# Patient Record
Sex: MALE | Race: WHITE | NOT HISPANIC OR LATINO | Employment: UNEMPLOYED | URBAN - METROPOLITAN AREA
[De-identification: names, ages, dates, MRNs, and addresses within clinical notes are randomized per-mention and may not be internally consistent; named-entity substitution may affect disease eponyms.]

---

## 2024-06-21 ENCOUNTER — HOSPITAL ENCOUNTER (EMERGENCY)
Facility: HOSPITAL | Age: 44
Discharge: HOME | End: 2024-06-21
Attending: EMERGENCY MEDICINE
Payer: MEDICAID

## 2024-06-21 VITALS
SYSTOLIC BLOOD PRESSURE: 119 MMHG | HEART RATE: 75 BPM | HEIGHT: 70 IN | TEMPERATURE: 98 F | OXYGEN SATURATION: 97 % | DIASTOLIC BLOOD PRESSURE: 81 MMHG | BODY MASS INDEX: 23.62 KG/M2 | RESPIRATION RATE: 18 BRPM | WEIGHT: 165 LBS

## 2024-06-21 DIAGNOSIS — I10 UNCONTROLLED HYPERTENSION: ICD-10-CM

## 2024-06-21 DIAGNOSIS — Z76.0 MEDICATION REFILL: Primary | ICD-10-CM

## 2024-06-21 PROCEDURE — 99281 EMR DPT VST MAYX REQ PHY/QHP: CPT

## 2024-06-21 RX ORDER — LISINOPRIL 10 MG/1
10 TABLET ORAL DAILY
Qty: 30 TABLET | Refills: 2 | Status: SHIPPED | OUTPATIENT
Start: 2024-06-21 | End: 2024-09-19

## 2024-06-21 NOTE — FIRST PROVIDER EVALUATION
Emergency Department TeleTriage Encounter Note      CHIEF COMPLAINT    Chief Complaint   Patient presents with    BLOOD PRESSURE PROBLEM     STATES HE KNOWS WHEN HIS BP GOES UP AND OR DOWN. WANTS TO BE CHECKED OUT    Medication Refill     BP MEDS OUT X 2 MOS       VITAL SIGNS   Initial Vitals [06/21/24 1445]   BP Pulse Resp Temp SpO2   (!) 163/104 90 16 98 °F (36.7 °C) 97 %      MAP       --            ALLERGIES    Review of patient's allergies indicates:  No Known Allergies    PROVIDER TRIAGE NOTE  This is a teletriage evaluation of a 43 y.o. male presenting to the ED complaining of blood pressure elevated. Patient has been living at a shelter and has not had his blood pressure medications for about 2 months. He denies chest pain, shortness of breath, or headache at this time.     Patient is alert and oriented. He speaks in complete sentences. He is sitting upright in the chair in no distress.     Initial orders will be placed and care will be transferred to an alternate provider when patient is roomed for a full evaluation. Any additional orders and the final disposition will be determined by that provider.         ORDERS  Labs Reviewed - No data to display    ED Orders (720h ago, onward)      None              Virtual Visit Note: The provider triage portion of this emergency department evaluation and documentation was performed via [employer]dex, a HIPAA-compliant telemedicine application, in concert with a tele-presenter in the room. A face to face patient evaluation with one of my colleagues will occur once the patient is placed in an emergency department room.      DISCLAIMER: This note was prepared with We[employer] voice recognition transcription software. Garbled syntax, mangled pronouns, and other bizarre constructions may be attributed to that software system.

## 2024-06-21 NOTE — ED PROVIDER NOTES
Encounter Date: 6/21/2024       History     Chief Complaint   Patient presents with    BLOOD PRESSURE PROBLEM     STATES HE KNOWS WHEN HIS BP GOES UP AND OR DOWN. WANTS TO BE CHECKED OUT    Medication Refill     BP MEDS OUT X 2 MOS     43-year-old male with past medical history of hypertension presents emergency department for blood pressure medication refill.  Patient says that he is recently moved back to the area and he has in a program and he needs his blood pressure medication.  He does not have a primary care provider.  He says that he has been out of his blood pressure medication for about 2 months.  Currently has no symptoms.  He denies any chest pain shortness of breath or any headache.  He used to take lisinopril 10 mg daily but has not had it in 2 months.  He says when his blood pressure goes high he can feel strange and that he needs to get back on his medicine.  Currently he does not feel strange.      Review of patient's allergies indicates:  No Known Allergies  Past Medical History:   Diagnosis Date    Homeless     Hypertension      No past surgical history on file.  No family history on file.     Review of Systems   Constitutional:  Negative for chills and fever.   HENT:  Negative for sore throat.    Respiratory:  Negative for shortness of breath.    Cardiovascular:  Negative for chest pain.   Gastrointestinal:  Negative for nausea.   Genitourinary:  Negative for dysuria.   Musculoskeletal:  Negative for back pain.   Skin:  Negative for rash.   Neurological:  Negative for weakness.   Hematological:  Does not bruise/bleed easily.   All other systems reviewed and are negative.      Physical Exam     Initial Vitals [06/21/24 1445]   BP Pulse Resp Temp SpO2   (!) 163/104 90 16 98 °F (36.7 °C) 97 %      MAP       --         Physical Exam    Nursing note and vitals reviewed.  Constitutional: He appears well-developed and well-nourished. He is not diaphoretic. No distress.   HENT:   Head: Normocephalic and  atraumatic.   Mouth/Throat: Oropharynx is clear and moist. No oropharyngeal exudate.   Eyes: Conjunctivae and EOM are normal. Pupils are equal, round, and reactive to light.   Neck: Neck supple. No tracheal deviation present.   Normal range of motion.  Cardiovascular:  Normal rate, regular rhythm, normal heart sounds and intact distal pulses.           No murmur heard.  Pulmonary/Chest: Breath sounds normal. No stridor. No respiratory distress. He has no wheezes. He has no rhonchi. He has no rales. He exhibits no tenderness.   Abdominal: Abdomen is soft. Bowel sounds are normal. He exhibits no distension. There is no abdominal tenderness. There is no rebound and no guarding.   Musculoskeletal:         General: No tenderness or edema. Normal range of motion.      Cervical back: Normal range of motion and neck supple.     Neurological: He is alert and oriented to person, place, and time. He has normal strength. No cranial nerve deficit or sensory deficit.   Skin: Skin is warm and dry. Capillary refill takes less than 2 seconds. No rash noted. No erythema. No pallor.   Psychiatric: He has a normal mood and affect. His behavior is normal. Thought content normal.         ED Course   Procedures  Labs Reviewed - No data to display       Imaging Results    None          Medications - No data to display  Medical Decision Making  Differential includes but not limited to dehydration, hypertension uncontrolled, medication refill,    Emergent evaluation 43-year-old male presents emergency department with medication refill for blood pressure.  Patient is asymptomatic.  Patient does have hypertension but has been out of his medications for 2 months.  I will refill his lisinopril.  I will set him up with [facility] for primary care provider.  He will return if his symptoms change or worsen.  I discussed with pt. need for better control of BP and counseled him on prolonged uncontrolled hypertension leading to MI, Stroke,  Dissection, Kidney Failure, etc. and the patient understood. The patient has good follow up with the primary care provider and is agreeable to follow up. There is no hypertensive emergency in the ED. The patient is asymptomatic.      A dictation software program was used for this note.  Please expect some simple typographical  errors in this note.     Risk  OTC drugs.  Prescription drug management.                                      Clinical Impression:  Final diagnoses:  [Z76.0] Medication refill (Primary)  [I10] Uncontrolled hypertension          ED Disposition Condition    Discharge Stable          ED Prescriptions       Medication Sig Dispense Start Date End Date Auth. Provider    lisinopriL 10 MG tablet Take 1 tablet (10 mg total) by mouth once daily. 30 tablet 6/21/2024 9/19/2024 Harvey Martin DO          Follow-up Information       Follow up With Specialties Details Why Contact Info Additional Information    [facility] - Emergency Dept Emergency Medicine  If symptoms worsen [address] [facility] [address]  [phone] 1st floor    [facility]  In 1 week 3 [address] [facility] [address]  [phone]       [facility]    [address] [address]  [phone]                Harvey Martin DO  06/21/24 5062

## 2024-06-25 ENCOUNTER — OFFICE VISIT (OUTPATIENT)
Dept: URGENT CARE | Facility: CLINIC | Age: 44
End: 2024-06-25
Payer: MEDICAID

## 2024-06-25 VITALS
HEIGHT: 70 IN | TEMPERATURE: 98 F | BODY MASS INDEX: 23.62 KG/M2 | RESPIRATION RATE: 16 BRPM | OXYGEN SATURATION: 97 % | WEIGHT: 165 LBS | DIASTOLIC BLOOD PRESSURE: 97 MMHG | SYSTOLIC BLOOD PRESSURE: 131 MMHG | HEART RATE: 109 BPM

## 2024-06-25 DIAGNOSIS — T73.3XXA FATIGUE DUE TO EXCESSIVE EXERTION, INITIAL ENCOUNTER: ICD-10-CM

## 2024-06-25 DIAGNOSIS — L97.521 ULCER OF LEFT FOOT, LIMITED TO BREAKDOWN OF SKIN: Primary | ICD-10-CM

## 2024-06-25 LAB — GLUCOSE SERPL-MCNC: 99 MG/DL (ref 70–110)

## 2024-06-25 PROCEDURE — 99204 OFFICE O/P NEW MOD 45 MIN: CPT | Mod: S$GLB,,,

## 2024-06-25 PROCEDURE — 82962 GLUCOSE BLOOD TEST: CPT | Mod: ,,,

## 2024-06-25 NOTE — PATIENT INSTRUCTIONS
Sure you keep your feet clean and dry.  Limited to a single pair of socks.  Use mole skin dressings.  Use powder on the feet.  Return to clinic or go to the emergency room if you notice pus, or redness around the the ulcer

## 2024-06-25 NOTE — PROGRESS NOTES
"Subjective:      Patient ID: Teofilo Garcia is a 43 y.o. male.    Vitals:  height is 5' 10" (1.778 m) and weight is 74.8 kg (165 lb). His temperature is 98.2 °F (36.8 °C). His blood pressure is 131/97 (abnormal) and his pulse is 109. His respiration is 16 and oxygen saturation is 97%.     Chief Complaint: Blister    Pt c/o blisters on sole of left feet and fatigue due to heat exposure.  Patient reports he was walking from [place] to [place] to get to a shelter.  He was no family, or friends that could provide transportation.  He has been wearing multiple layers of socks to pad the area.  He denies being a diabetic.  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  All of his sensations are intact.  He was good cap refill.  Pulses are +2.  There is no obvious cellulitis or erythema        Constitution: Positive for fatigue.   Skin:  Positive for lesion.      Objective:     Physical Exam   Constitutional: He is oriented to person, place, and time. He is cooperative.   HENT:   Head: Normocephalic and atraumatic.   Ears:   Right Ear: External ear normal.   Left Ear: External ear normal.   Nose: Nose normal.   Mouth/Throat: Uvula is midline, oropharynx is clear and moist and mucous membranes are normal. Mucous membranes are moist. Oropharynx is clear.   Eyes: Conjunctivae and lids are normal. Pupils are equal, round, and reactive to light. Extraocular movement intact   Neck: Trachea normal and phonation normal. Neck supple.   Cardiovascular: Normal rate, regular rhythm, normal heart sounds and normal pulses.   Pulses:       Dorsalis pedis pulses are 2+ on the left side.   Pulmonary/Chest: Effort normal and breath sounds normal.   Abdominal: Normal appearance.   Musculoskeletal: Normal range of motion.         General: Normal range of motion.      Left foot: Plantar foot sensation: normal. Comments: Partial-thickness friction ulcer between 3rd and 4th metatarsal on plantar portion.  There is no " drainage, fluctuance, or erythema.  Sensations are intact.   Neurological: no focal deficit. He is alert, oriented to person, place, and time and at baseline. He has normal motor skills, normal sensation and intact cranial nerves (2-12). Gait and coordination normal. GCS eye subscore is 4. GCS verbal subscore is 5. GCS motor subscore is 6.   Skin: Skin is warm and dry. Capillary refill takes 2 to 3 seconds. lesion   Psychiatric: He experiences Normal attention and Normal perception. His speech is normal and behavior is normal. Mood, judgment and thought content normal.   Nursing note and vitals reviewed.      Assessment:     1. Ulcer of left foot, limited to breakdown of skin    2. Fatigue due to excessive exertion, initial encounter        Plan:       Ulcer of left foot, limited to breakdown of skin  -     POCT Glucose, Hand-Held Device    Fatigue due to excessive exertion, initial encounter  -     POCT Glucose, Hand-Held Device           Healthy nontoxic-appearing 43-year-old man history of hypertension in clinic chief complaint of ulceration to sole of left foot, and fatigue due to exertion in high temperature.  Denies history of diabetes.  Ulcer has been present for 2 days.  He has been walking from [place].  He was to have family or friends for transportation.  He was looking for assistance in transportation